# Patient Record
Sex: MALE | Race: WHITE | Employment: FULL TIME | URBAN - METROPOLITAN AREA
[De-identification: names, ages, dates, MRNs, and addresses within clinical notes are randomized per-mention and may not be internally consistent; named-entity substitution may affect disease eponyms.]

---

## 2020-01-22 ENCOUNTER — HOSPITAL ENCOUNTER (EMERGENCY)
Age: 35
Discharge: HOME | End: 2020-01-22
Payer: COMMERCIAL

## 2020-01-22 ENCOUNTER — APPOINTMENT (OUTPATIENT)
Dept: CT IMAGING | Age: 35
End: 2020-01-22
Payer: COMMERCIAL

## 2020-01-22 VITALS
HEIGHT: 72 IN | RESPIRATION RATE: 20 BRPM | WEIGHT: 315 LBS | HEART RATE: 88 BPM | SYSTOLIC BLOOD PRESSURE: 158 MMHG | BODY MASS INDEX: 42.66 KG/M2 | OXYGEN SATURATION: 97 % | DIASTOLIC BLOOD PRESSURE: 90 MMHG | TEMPERATURE: 98.8 F

## 2020-01-22 PROCEDURE — 72131 CT LUMBAR SPINE W/O DYE: CPT

## 2020-01-22 PROCEDURE — 97110 THERAPEUTIC EXERCISES: CPT

## 2020-01-22 PROCEDURE — 99283 EMERGENCY DEPT VISIT LOW MDM: CPT

## 2020-01-22 PROCEDURE — 96372 THER/PROPH/DIAG INJ SC/IM: CPT

## 2020-01-22 PROCEDURE — 6370000000 HC RX 637 (ALT 250 FOR IP): Performed by: NURSE PRACTITIONER

## 2020-01-22 PROCEDURE — 6360000002 HC RX W HCPCS: Performed by: NURSE PRACTITIONER

## 2020-01-22 PROCEDURE — 97161 PT EVAL LOW COMPLEX 20 MIN: CPT

## 2020-01-22 RX ORDER — CYCLOBENZAPRINE HCL 10 MG
10 TABLET ORAL 3 TIMES DAILY PRN
Qty: 21 TABLET | Refills: 0 | Status: SHIPPED | OUTPATIENT
Start: 2020-01-22 | End: 2020-02-01

## 2020-01-22 RX ORDER — KETOROLAC TROMETHAMINE 30 MG/ML
30 INJECTION, SOLUTION INTRAMUSCULAR; INTRAVENOUS ONCE
Status: COMPLETED | OUTPATIENT
Start: 2020-01-22 | End: 2020-01-22

## 2020-01-22 RX ORDER — PREDNISONE 10 MG/1
60 TABLET ORAL DAILY
Qty: 30 TABLET | Refills: 0 | Status: SHIPPED | OUTPATIENT
Start: 2020-01-22 | End: 2020-01-27

## 2020-01-22 RX ORDER — ORPHENADRINE CITRATE 30 MG/ML
60 INJECTION INTRAMUSCULAR; INTRAVENOUS ONCE
Status: COMPLETED | OUTPATIENT
Start: 2020-01-22 | End: 2020-01-22

## 2020-01-22 RX ORDER — NAPROXEN 500 MG/1
500 TABLET ORAL 2 TIMES DAILY
Qty: 20 TABLET | Refills: 0 | Status: SHIPPED | OUTPATIENT
Start: 2020-01-22 | End: 2020-02-01

## 2020-01-22 RX ORDER — PREDNISONE 20 MG/1
60 TABLET ORAL ONCE
Status: COMPLETED | OUTPATIENT
Start: 2020-01-22 | End: 2020-01-22

## 2020-01-22 RX ADMIN — PREDNISONE 60 MG: 20 TABLET ORAL at 10:12

## 2020-01-22 RX ADMIN — KETOROLAC TROMETHAMINE 30 MG: 30 INJECTION, SOLUTION INTRAMUSCULAR at 10:13

## 2020-01-22 RX ADMIN — ORPHENADRINE CITRATE 60 MG: 30 INJECTION INTRAMUSCULAR; INTRAVENOUS at 10:13

## 2020-01-22 ASSESSMENT — PAIN DESCRIPTION - LOCATION
LOCATION: BACK
LOCATION: BACK

## 2020-01-22 ASSESSMENT — PAIN SCALES - GENERAL
PAINLEVEL_OUTOF10: 10
PAINLEVEL_OUTOF10: 3

## 2020-01-22 ASSESSMENT — PAIN DESCRIPTION - PAIN TYPE: TYPE: ACUTE PAIN

## 2020-01-22 ASSESSMENT — PAIN DESCRIPTION - ORIENTATION: ORIENTATION: MID;LOWER

## 2020-01-22 ASSESSMENT — PAIN DESCRIPTION - DESCRIPTORS: DESCRIPTORS: DISCOMFORT;DULL

## 2020-01-22 NOTE — ED NOTES
LOWER EXTREMITY PHYSICAL THERAPY EVALUATION  EMERGENCY DEPARTMENT     Received referral for Physical Therapy Evaluation in the Emergency Department. Pt educated to role of PT in the ED, and pt agreeable to proceed with evaluation. Evaluation Date: 1/22/2020    Patient Name: Shashank Paul   YOB: 1985    Medical Diagnosis:  Back pain  Treatment Diagnosis:  Lumbar joint dysfunction  Onset Date:  1/22/20    Referral Date: 1/22/2020   Referring Provider: Cortney Fagan NP  Restrictions/Precautions:    stage 1 acute inflammatory process    SUBJECTIVE FINDINGS    History of Present Illness:  Pt presents with c/o low back pain. Pt reports that he was at work this morning when he went to pull a piece out of the machine that he was using. He notes that he was bent as far forward as he could be and grabbing for the piece. The piece got stuck and when he went to pull he felt a twinge in his back. He pulled again and this time he got the piece to move but he felt his back lock up as he straightened up. Notes that he has been unable to stand up straight since then. Denies radiating pain, N&T, B&B changes, saddle anesthesia. Red Flags:  none    Pain       Patient describes pain to be aching  Patient reports 6/10 pain at present and  9/10 pain at its worst.  Worsened by standing up straight, forward bending  Improved by rest  Pt. reports bowel and bladder changes (incontinence, retention):   []Yes   [x]No   []NA   Pt. reports saddle paresthesia? []Yes   [x]No   []NA   Current Functional Limitations: Had to leave work  PLOF:  Working full time (labor)  Pt's sleep is affected? []   Yes []   No  [x]   N/A      OBJECTIVE FINDINGS    Imaging Results:  Performed in ED today, 1/22/2020: CT scan  Impression   No acute fracture or subluxation.       Mild findings of spondylosis.       L5-S1 left paracentral disc protrusion and L5-S1 diffuse posterior disc   bulge.  No impingement on neural structures is evident. Function   [x] All dermatomes WNL except as marked below   [x] All  myotomes WNL except as marked below      Dermatome Left Right Myotome Left Right   Anterior groin, 2-3 inches below ASIS (L1-L2)   Hip Flexion (L1-L2)     Middle third anterior thigh (L3)   Hip adduction (L3)     Patella and med malleolus (L4)   Knee extension (L3,4)     Fibular head and dorsum of foot (L5)   Ankle dorsiflexion (L4)     Lateral side and plantar surface of foot (S1)   Hip abduction (L5)     Medial aspect of posterior thigh (S2)   Great toe extension (L5)     Perianal area (S3,4)   Knee flexion (L5,S1)        Ankle plantarflexion (S1,2)       Range of Motion/Strength Testing     [x] All ROM and strength WNL except as marked below   * Denotes limitation by pain    Range Tested AROM PROM MMT/Resisted    Left Right Left Right Left Right   Hip Flexion         Hip Extension         Hip Abduction         Hip Adduction         Hip IR         Hip ER         Knee Flexion         Knee Extension         Ankle Dorsiflex         Ankle Plantarflex         Ankle Inversion         Ankle Eversion           TREATMENT  Patient/Family Education   Educated on importance of continued strengthening to facilitate functional return  Educated on role of PT, POC as well as importance of continued activity  Educated on anatomy, physiology, and biomechanics of lumbar spine, sacrum, SIJ, intervertebral discs, and neurodynamics. Pt also educated on lifting mechanics for return to work. Pt verbalized understanding and all of patient's questions answered to satisfaction. Business card and HEP issued. Manual tx:   Shotgun technique without cavitation  LLE leg pull (With relief of + SLR)    Therex:  Prone lying  Prone prop  Lifting mechanics    Patients response to evaluation/treatment:  Pt tolerated treatment well  reduction of symptoms  (-) SLR      ASSESSMENT  Pt is a 38 y/o presenting to ED with c/o back pain.  Thorough evaluation and examination, identified findings consistent with lumbar joint dysfunction. Negative Yuliya repeated movement testing. Unable to thoroughly assess pelvis and SI this date due to pt's body habitus. Pt with (-) SLR LLE following manual therapy. Pt was able to stand erect and ambulate with only minimal discomfort following treatment. PT recommending HEP. Discussed findings and recommendations with referring provider. PLAN OF CARE    One time visit in the setting of the ED. No goals written. Thank you for the referral of this patient.      Timed Code Treatment Minutes:  15  minutes            Total Treatment Time: 25  minutes      Amy Alves PT  license #[id number]

## 2020-01-22 NOTE — LETTER
[facility]  [address] [address]  Phone: [phone]  Fax: [phone]               January 23, 2020    Patient: Katina Godinez   YOB: 1985   Date of Visit: 1/22/2020       To Whom It May Concern:    Katina Godinez was seen and treated in our emergency department on 1/22/2020. He may return to work on 1-27-20 without restrictions.   .      Sincerely,       Jose Dominguez RN         Signature:__________________________________

## 2020-01-22 NOTE — ED PROVIDER NOTES
tenderness, without crepitus, deformity, or step off. Patient exhibits no producible tenderness of paraspinal musculature to the cervical, thoracic, lumbar region. There is no point tenderness over the SI Joint. Patellar reflexes +2. Distal pulses intact. Cap refill < 2 seconds. Sensation intact. Neurovascularly intact. HSNE spine intact. RADIOLOGY  Ct Lumbar Spine Wo Contrast    Result Date: 1/22/2020  EXAMINATION: CT OF THE LUMBAR SPINE WITHOUT CONTRAST  1/22/2020 TECHNIQUE: CT of the lumbar spine was performed without the administration of intravenous contrast. Multiplanar reformatted images are provided for review. Dose modulation, iterative reconstruction, and/or weight based adjustment of the mA/kV was utilized to reduce the radiation dose to as low as reasonably achievable. COMPARISON: None HISTORY: ORDERING SYSTEM PROVIDED HISTORY: bending injury at work TECHNOLOGIST PROVIDED HISTORY: Reason for exam:->bending injury at work Reason for Exam: wrenched back while pulling parts this morning, center low back pain since Acuity: Acute Type of Exam: Initial FINDINGS: BONES/ALIGNMENT: There is normal alignment of the spine. The vertebral body heights are maintained. No osseous destructive lesion is seen. DEGENERATIVE CHANGES: At T12-L1, L1-and L2-3, the central canal neural foramina are widely patent. At L3-4, there is a mild posterior disc bulge. There is no significant central or foraminal stenosis. At L4-5 a Schmorl's nodule is noted in the superior L4 endplate. There is a shallow left paracentral disc protrusion. No indentation of the thecal sac or displacement of neural structures is appreciated. Facets are mildly hypertrophy. The neural foramina are patent. At L5-S1 the disc space is narrowed with anterior osteophyte formation. There is a small posterior disc bulge. There is no significant central stenosis. There is mild bilateral foraminal narrowing.  SOFT TISSUES/RETROPERITONEUM: No paraspinal and/or patient family. Patient will follow up with ortho spine and physical therapy for further evaluation/treatment. The patient was given strict return precautions as we discussed symptoms that would necessitate return to the ED. Patient will return to ED for new/worsening symptoms. The patient verbalized their understanding and agreement with the above plan. Please refer to AVS for further details regarding discharge instructions. No results found for this visit on 01/22/20. I estimate there is LOW risk for ABDOMINAL AORTIC ANEURYSM, CAUDA EQUINA SYNDROME, EPIDURAL MASS LESION, SPINAL STENOSIS, OR HERNIATED DISK CAUSING SEVERE STENOSIS, thus I consider the discharge disposition reasonable. Gianfranco Matias and I have discussed the diagnosis and risks, and we agree with discharging home to follow-up with their primary doctor. We also discussed returning to the Emergency Department immediately if new or worsening symptoms occur. We have discussed the symptoms which are most concerning (e.g., saddle anesthesia, urinary or bowel incontinence or retention, changing or worsening pain) that necessitate immediate return. Final Impression    1. Strain of lumbar region, initial encounter    2. Spasm of muscle    3. Protrusion of intervertebral disc of lumbosacral region        Blood pressure (!) 158/90, pulse 88, temperature 98.8 °F (37.1 °C), temperature source Oral, resp. rate 20, height 6' (1.829 m), weight (!) 360 lb (163.3 kg), SpO2 97 %. mdm    Patient was sent home with a prescription for below medication/s. I did [place] patient on appropriate use of these medication.   Discharge Medication List as of 1/22/2020 11:12 AM      START taking these medications    Details   cyclobenzaprine (FLEXERIL) 10 MG tablet Take 1 tablet by mouth 3 times daily as needed for Muscle spasms, Disp-21 tablet, R-0Print      naproxen (NAPROSYN) 500 MG tablet Take 1 tablet by mouth 2 times daily for 20 doses, Disp-20 tablet,